# Patient Record
Sex: FEMALE | Race: WHITE | NOT HISPANIC OR LATINO | Employment: UNEMPLOYED | URBAN - NONMETROPOLITAN AREA
[De-identification: names, ages, dates, MRNs, and addresses within clinical notes are randomized per-mention and may not be internally consistent; named-entity substitution may affect disease eponyms.]

---

## 2017-02-23 ENCOUNTER — PATIENT MESSAGE (OUTPATIENT)
Dept: MEDICAL GROUP | Facility: PHYSICIAN GROUP | Age: 27
End: 2017-02-23

## 2017-04-18 ENCOUNTER — OFFICE VISIT (OUTPATIENT)
Dept: MEDICAL GROUP | Facility: PHYSICIAN GROUP | Age: 27
End: 2017-04-18
Payer: OTHER GOVERNMENT

## 2017-04-18 VITALS
WEIGHT: 223 LBS | DIASTOLIC BLOOD PRESSURE: 74 MMHG | OXYGEN SATURATION: 99 % | BODY MASS INDEX: 38.07 KG/M2 | SYSTOLIC BLOOD PRESSURE: 126 MMHG | HEIGHT: 64 IN | HEART RATE: 102 BPM | RESPIRATION RATE: 16 BRPM | TEMPERATURE: 99.8 F

## 2017-04-18 DIAGNOSIS — M25.561 CHRONIC PAIN OF RIGHT KNEE: ICD-10-CM

## 2017-04-18 DIAGNOSIS — G89.29 CHRONIC PAIN OF RIGHT KNEE: ICD-10-CM

## 2017-04-18 DIAGNOSIS — F41.9 ANXIETY: ICD-10-CM

## 2017-04-18 PROCEDURE — 99213 OFFICE O/P EST LOW 20 MIN: CPT | Performed by: NURSE PRACTITIONER

## 2017-04-19 NOTE — ASSESSMENT & PLAN NOTE
Patient is finally discontinued all her psych meds. She weaned off slowly was able to help herself with talk therapy and understanding what was actually going on with her feelings she feels fairly stable now no suicidal ideation or homicidal ideation and no panic. She is off medicines and planning to get pregnant in the next 6 months.

## 2017-04-19 NOTE — PROGRESS NOTES
Chief Complaint   Patient presents with   • Knee Pain     rt knee / weight loss / pregnancy        HISTORY OF PRESENT ILLNESS: Patient is a @ age 26 here  today to discuss:    Interval history:     Hospitalizations NO     Injuries  NO     Illness  NO        Anxiety  Patient is finally discontinued all her psych meds. She weaned off slowly was able to help herself with talk therapy and understanding what was actually going on with her feelings she feels fairly stable now no suicidal ideation or homicidal ideation and no panic. She is off medicines and planning to get pregnant in the next 6 months.    BMI 35.0-35.9,adult  Patient continues to be frustrated with weight gain. She describes normal weight is a college student, and then excessive calories and drinking put on about 100 pounds. She is now  stable no partying and finds herself 100 pounds overweight. She is here for information. I suggested Weight Watchers and to attend meetings to learn how to navigate to the foods and get support. As far as exercise she is to return to swimming soon as the pool is available to her and I suggest that she sign up for a 5K.    Chronic pain of right knee  Patient had some days if not weeks with severe knee pain bilateral, patient pasted rested and realized that her weight gaining has hurt her knees. This is giving her more motivation to try to lose away. Currently the knees are not painful no redness, swelling, or weakness noted. There is no injury.        Allergies:Review of patient's allergies indicates no known allergies.    Current Outpatient Prescriptions Ordered in [facility]   Medication Sig Dispense Refill   • sertraline (ZOLOFT) 25 MG tablet Take one half pill daily. 90 Tab 0     No current Epic-ordered facility-administered medications on file.       Past Medical History   Diagnosis Date   • Anxiety    • Depression        Social History   Substance Use Topics   • Smoking status: Never Smoker    • Smokeless tobacco:  "Never Used   • Alcohol Use: Yes      Comment: occassional       Family Status   Relation Status Death Age   • Mother Alive    • Father Alive    • Brother Alive    History reviewed. No pertinent family history.    ROS: As documented in my HPI      Exam:  Blood pressure 126/74, pulse 102, temperature 37.7 °C (99.8 °F), resp. rate 16, height 1.626 m (5' 4.02\"), weight 101.152 kg (223 lb), SpO2 99 %.  General:  Well nourished, well developed female in NAD  Head: Nontender scalp. No lesions  Neck: Supple. Symmetric Thyroid palpated. No bruits  Pulmonary:  Normal effort.   Cardiovascular: Regular rate   Extremities: no clubbing, cyanosis, or edema.  Psych: Alert and oriented x3.    Neurological: No focal deficits    Please note that this dictation was created using voice recognition software. I have made every reasonable attempt to correct obvious errors, but I expect that there are errors of grammar and possibly content that I did not discover before finalizing the note.    Assessment/Plan:  1. Chronic pain of right knee  Current status of condition is chronic and controlled on therapy.     2. Anxiety  No medications and stable.    3. BMI 35.0-35.9,adult  Patient identified as having weight management issue.  Appropriate orders and counseling given.      Planning pregnancy: remove IUD in August ( she can schedule with  Me) On prenatal vitamins.     "

## 2017-04-19 NOTE — ASSESSMENT & PLAN NOTE
Patient had some days if not weeks with severe knee pain bilateral, patient pasted rested and realized that her weight gaining has hurt her knees. This is giving her more motivation to try to lose away. Currently the knees are not painful no redness, swelling, or weakness noted. There is no injury.

## 2017-04-19 NOTE — ASSESSMENT & PLAN NOTE
Patient continues to be frustrated with weight gain. She describes normal weight is a college student, and then excessive calories and drinking put on about 100 pounds. She is now  stable no partying and finds herself 100 pounds overweight. She is here for information. I suggested Weight Watchers and to attend meetings to learn how to navigate to the foods and get support. As far as exercise she is to return to swimming soon as the pool is available to her and I suggest that she sign up for a 5K.

## 2017-06-13 ENCOUNTER — OFFICE VISIT (OUTPATIENT)
Dept: URGENT CARE | Facility: PHYSICIAN GROUP | Age: 27
End: 2017-06-13
Payer: OTHER GOVERNMENT

## 2017-06-13 VITALS
HEART RATE: 115 BPM | DIASTOLIC BLOOD PRESSURE: 98 MMHG | TEMPERATURE: 98.4 F | RESPIRATION RATE: 18 BRPM | BODY MASS INDEX: 37.39 KG/M2 | WEIGHT: 219 LBS | SYSTOLIC BLOOD PRESSURE: 130 MMHG | HEIGHT: 64 IN | OXYGEN SATURATION: 98 %

## 2017-06-13 PROCEDURE — 99202 OFFICE O/P NEW SF 15 MIN: CPT | Performed by: EMERGENCY MEDICINE

## 2017-06-13 ASSESSMENT — ENCOUNTER SYMPTOMS
HEADACHES: 0
FOCAL WEAKNESS: 0
SHORTNESS OF BREATH: 0
NAUSEA: 0
BLURRED VISION: 0
TREMORS: 0
WEAKNESS: 0
NERVOUS/ANXIOUS: 1
FEVER: 0
DIZZINESS: 0
ABDOMINAL PAIN: 0
BACK PAIN: 0
VOMITING: 0
TINGLING: 1
VISUAL CHANGE: 0
ARTHRALGIAS: 0
NECK PAIN: 0

## 2017-06-13 NOTE — MR AVS SNAPSHOT
"        Brandie Mj   2017 8:40 AM   Office Visit   MRN: [id number]    Department:  [facility]   Dept Phone:  [phone]    Description:  Female : 1990   Provider:  Vern Marx M.D.           Reason for Visit     Tingling tingling/numbness right leg/right hand ( started 3 am      Allergies as of 2017     No Known Allergies      You were diagnosed with     Paresthesias/numbness   [[id number]]         Vital Signs     Blood Pressure Pulse Temperature Respirations Height Weight    130/98 mmHg 115 36.9 °C (98.4 °F) 18 1.626 m (5' 4\") 99.338 kg (219 lb)    Body Mass Index Oxygen Saturation Smoking Status             37.57 kg/m2 98% Never Smoker          Basic Information     Date Of Birth Sex Race Ethnicity Preferred Language    1990 Female White Non- English      Problem List              ICD-10-CM Priority Class Noted - Resolved    Anxiety F41.9   2016 - Present    Depression F32.9   2016 - Present    BMI 35.0-35.9,adult Z68.35   2016 - Present    Chronic pain of right knee M25.561, G89.29   2017 - Present      Health Maintenance        Date Due Completion Dates    IMM DTaP/Tdap/Td Vaccine (1 - Tdap) 2007 ---    PAP SMEAR 2009 ---            Current Immunizations     No immunizations on file.      Below and/or attached are the medications your provider expects you to take. Review all of your home medications and newly ordered medications with your provider and/or pharmacist. Follow medication instructions as directed by your provider and/or pharmacist. Please keep your medication list with you and share with your provider. Update the information when medications are discontinued, doses are changed, or new medications (including over-the-counter products) are added; and carry medication information at all times in the event of emergency situations     Allergies:  No Known Allergies          Medications  Valid as of: 2017 - 10:56 AM   " Generic Name Brand Name Tablet Size Instructions for use    Prenatal MV-Min-Fe Fum-FA-DHA   Take  by mouth.        Sertraline HCl (Tab) ZOLOFT 25 MG Take one half pill daily.        Turmeric   Take  by mouth.        .                 Medicines prescribed today were sent to:     [id number]  [place], [place] - [address] [facility] [address] [facility] [address]    [address] [place] [address]    Phone: [phone] Fax: [phone]    Open 24 Hours?: No      Medication refill instructions:       If your prescription bottle indicates you have medication refills left, it is not necessary to call your provider’s office. Please contact your pharmacy and they will refill your medication.    If your prescription bottle indicates you do not have any refills left, you may request refills at any time through one of the following ways: The online EXENDIS system (except Urgent Care), by calling your provider’s office, or by asking your pharmacy to contact your provider’s office with a refill request. Medication refills are processed only during regular business hours and may not be available until the next business day. Your provider may request additional information or to have a follow-up visit with you prior to refilling your medication.   *Please Note: Medication refills are assigned a new Rx number when refilled electronically. Your pharmacy may indicate that no refills were authorized even though a new prescription for the same medication is available at the pharmacy. Please request the medicine by name with the pharmacy before contacting your provider for a refill.        Instructions    Paresthesia  Paresthesia is a burning or prickling feeling. This feeling can happen in any part of the body. It often happens in the hands, arms, legs, or feet. Usually, it is not painful. In most cases, the feeling goes away in a short time and is not a sign of a serious problem.  HOME CARE  · Avoid drinking  alcohol.  · Try massage or needle therapy (acupuncture) to help with your problems.  · Keep all follow-up visits as told by your doctor. This is important.  GET HELP IF:  · You keep on having episodes of paresthesia.  · Your burning or prickling feeling gets worse when you walk.  · You have pain or cramps.  · You feel dizzy.  · You have a rash.  GET HELP RIGHT AWAY IF:  · You feel weak.  · You have trouble walking or moving.  · You have problems speaking, understanding, or seeing.  · You feel confused.  · You cannot control when you pee (urinate) or poop (bowel movement).  · You lose feeling (numbness) after an injury.  · You pass out (faint).     This information is not intended to replace advice given to you by your health care provider. Make sure you discuss any questions you have with your health care provider.     Document Released: 11/30/2009 Document Revised: 05/03/2016 Document Reviewed: 12/14/2015  [employer] Interactive Patient Education ©2016 [employer]            [employer] Access Code: Activation code not generated  Current [employer] Status: Active

## 2017-06-13 NOTE — PATIENT INSTRUCTIONS
Paresthesia  Paresthesia is a burning or prickling feeling. This feeling can happen in any part of the body. It often happens in the hands, arms, legs, or feet. Usually, it is not painful. In most cases, the feeling goes away in a short time and is not a sign of a serious problem.  HOME CARE  · Avoid drinking alcohol.  · Try massage or needle therapy (acupuncture) to help with your problems.  · Keep all follow-up visits as told by your doctor. This is important.  GET HELP IF:  · You keep on having episodes of paresthesia.  · Your burning or prickling feeling gets worse when you walk.  · You have pain or cramps.  · You feel dizzy.  · You have a rash.  GET HELP RIGHT AWAY IF:  · You feel weak.  · You have trouble walking or moving.  · You have problems speaking, understanding, or seeing.  · You feel confused.  · You cannot control when you pee (urinate) or poop (bowel movement).  · You lose feeling (numbness) after an injury.  · You pass out (faint).     This information is not intended to replace advice given to you by your health care provider. Make sure you discuss any questions you have with your health care provider.     Document Released: 11/30/2009 Document Revised: 05/03/2016 Document Reviewed: 12/14/2015  [employer] Interactive Patient Education ©2016 [employer]

## 2017-06-13 NOTE — PROGRESS NOTES
"Subjective:      Brandie Barker is a 26 y.o. female who presents with Tingling            Tingling  This is a new problem. The current episode started today. The problem occurs constantly. The problem has been gradually improving. Pertinent negatives include no abdominal pain, arthralgias, chest pain, fever, headaches, nausea, neck pain, rash, visual change, vomiting or weakness. Nothing aggravates the symptoms. She has tried nothing for the symptoms.    notes recovering from recent URI; also notes taking tumeric recently. Onset associated with sleeping on couch. No trauma.     Review of Systems   Constitutional: Negative for fever.   Eyes: Negative for blurred vision.   Respiratory: Negative for shortness of breath.    Cardiovascular: Negative for chest pain.   Gastrointestinal: Negative for nausea, vomiting and abdominal pain.   Musculoskeletal: Negative for back pain, arthralgias and neck pain.   Skin: Negative for rash.   Neurological: Negative for dizziness, tremors, focal weakness, weakness and headaches.   Psychiatric/Behavioral: The patient is nervous/anxious.      PMH:  has a past medical history of Anxiety and Depression.  MEDS:   Current outpatient prescriptions:   •  Prenatal MV-Min-Fe Fum-FA-DHA (PRENATAL 1 PO), Take  by mouth., Disp: , Rfl:   •  TURMERIC PO, Take  by mouth., Disp: , Rfl:   •  sertraline (ZOLOFT) 25 MG tablet, Take one half pill daily., Disp: 90 Tab, Rfl: 0  ALLERGIES: No Known Allergies  SURGHX: History reviewed. No pertinent past surgical history.  SOCHX:  reports that she has never smoked. She has never used smokeless tobacco. She reports that she drinks alcohol. She reports that she does not use illicit drugs.  FH: family history is not on file.       Objective:     /98 mmHg  Pulse 115  Temp(Src) 36.9 °C (98.4 °F)  Resp 18  Ht 1.626 m (5' 4\")  Wt 99.338 kg (219 lb)  BMI 37.57 kg/m2  SpO2 98%     Physical Exam   Constitutional: She is oriented to person, place, and time. " She appears well-developed and well-nourished. She is cooperative. She does not have a sickly appearance. She does not appear ill. No distress.   HENT:   Head: Normocephalic.   Eyes: Conjunctivae are normal.   Neck: Full passive range of motion without pain and phonation normal. Neck supple.   Cardiovascular: Normal rate, regular rhythm and normal heart sounds.    Pulmonary/Chest: Effort normal and breath sounds normal.   Abdominal: She exhibits no distension.   Musculoskeletal:        Cervical back: She exhibits no tenderness.        Thoracic back: She exhibits no tenderness.        Lumbar back: She exhibits no tenderness.   Neurological: She is alert and oriented to person, place, and time. She has normal strength. She displays no tremor. No sensory deficit. Coordination and gait normal.   Skin: Skin is warm, dry and intact.   Psychiatric: She has a normal mood and affect. Her speech is normal and behavior is normal.               Assessment/Plan:     1. Paresthesias/numbness  Advised likely self-limited process associated with position sleeping, possible role of turmeric.  Advise follow with PCP if symptoms persist.

## 2017-06-27 ENCOUNTER — TELEPHONE (OUTPATIENT)
Dept: MEDICAL GROUP | Facility: PHYSICIAN GROUP | Age: 27
End: 2017-06-27

## 2017-06-27 ENCOUNTER — PATIENT MESSAGE (OUTPATIENT)
Dept: MEDICAL GROUP | Facility: PHYSICIAN GROUP | Age: 27
End: 2017-06-27

## 2017-06-27 NOTE — TELEPHONE ENCOUNTER
From: Brandie Barker  To: SIMONE Ann  Sent: 6/27/2017 12:19 AM PDT  Subject: Non-Urgent Medical Question    Hi Dr. Perry,    I'm having high blood pressure and my  advice nurse recommended I contact and see my provider within 18 hours. If I can see you as soon as possible, that would be great. I will also call the office tomorrow. Thank you in advance.

## 2017-06-28 ENCOUNTER — OFFICE VISIT (OUTPATIENT)
Dept: MEDICAL GROUP | Facility: PHYSICIAN GROUP | Age: 27
End: 2017-06-28
Payer: OTHER GOVERNMENT

## 2017-06-28 VITALS
HEART RATE: 111 BPM | SYSTOLIC BLOOD PRESSURE: 150 MMHG | BODY MASS INDEX: 36.7 KG/M2 | WEIGHT: 215 LBS | DIASTOLIC BLOOD PRESSURE: 96 MMHG | OXYGEN SATURATION: 98 % | RESPIRATION RATE: 12 BRPM | HEIGHT: 64 IN | TEMPERATURE: 98.4 F

## 2017-06-28 DIAGNOSIS — F32.9 REACTIVE DEPRESSION: ICD-10-CM

## 2017-06-28 DIAGNOSIS — R03.0 TRANSIENT ELEVATED BLOOD PRESSURE: ICD-10-CM

## 2017-06-28 DIAGNOSIS — F41.9 ANXIETY: ICD-10-CM

## 2017-06-28 PROCEDURE — 99214 OFFICE O/P EST MOD 30 MIN: CPT | Performed by: NURSE PRACTITIONER

## 2017-06-28 RX ORDER — ESCITALOPRAM OXALATE 10 MG/1
10 TABLET ORAL DAILY
Qty: 90 TAB | Refills: 1 | Status: SHIPPED | OUTPATIENT
Start: 2017-06-28 | End: 2018-02-19 | Stop reason: SDUPTHER

## 2017-06-28 RX ORDER — HYDROCHLOROTHIAZIDE 12.5 MG/1
12.5 TABLET ORAL DAILY
Qty: 30 TAB | Refills: 1 | Status: SHIPPED | OUTPATIENT
Start: 2017-06-28 | End: 2017-08-25

## 2017-06-28 NOTE — PATIENT INSTRUCTIONS
Start Lexapro 10 mg daily    Follow up with me in 2 months    Low dose BP med, likely for month or 2--probably less    Referral to psychology for CBT

## 2017-06-28 NOTE — PROGRESS NOTES
Chief Complaint   Patient presents with   • Other     anxiety questions         This is a 26 y.o.female patient that presents today with the following: Anxiety    Anxiety  This is a chronic condition, that had previously been under control and she completely discontinued her medication. 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 She has been on Zoloft in the past, and while this controlled her symptoms well she did not like the side effects which included decreased libido. She does deny suicidal and homicidal ideations, hallucinations, racing thoughts and flights of ideas. She already verbalizes understanding of ways to cope with her depression and anxiety--as she has seen a therapist in the past.  She is concerned because as her anxiety has increased, she notes that her blood pressure has increased as well. Today is 150/96, and she notes that the last time her anxiety started to exacerbate, her blood pressure worsened. She also admits to a bit of white coat syndrome. I discussed with her options for treatment including pharmacotherapy and counseling, patient is interested in both would like to start medication and has less sexual side effects. We will have her start Lexapro 10 mg daily. Referral has been placed to psychology for CBT. I will start her on a very low dose diuretic for blood pressure just likely transient due to her anxiety. She is to follow-up with me in 2 months.        No results found for any previous visit.      clinical course has been stable    Past Medical History   Diagnosis Date   • Anxiety    • Depression        No past surgical history on file.    No family history on file.    Review of patient's allergies indicates no known allergies.    Current Outpatient Prescriptions Ordered in [facility]   Medication Sig Dispense Refill   • escitalopram (LEXAPRO) 10 MG Tab Take 1 Tab by mouth every day.  "90 Tab 1   • hydrochlorothiazide (HYDRODIURIL) 12.5 MG tablet Take 1 Tab by mouth every day. 30 Tab 1   • Prenatal MV-Min-Fe Fum-FA-DHA (PRENATAL 1 PO) Take  by mouth.       No current Epic-ordered facility-administered medications on file.       Constitutional ROS: No unexpected change in weight, No weakness, No unexplained fevers, sweats, or chills  Pulmonary ROS: No chronic cough, sputum, or hemoptysis, No shortness of breath, No recent change in breathing  Cardiovascular ROS: No chest pain, No edema, No palpitations, Positive for elevated blood pressure, per history of present illness  Gastrointestinal ROS: No abdominal pain, No nausea, vomiting, diarrhea, or constipation, no blood in stool  Musculoskeletal/Extremities ROS: No clubbing, No peripheral edema, No pain, redness or swelling on the joints  Neurologic ROS: Normal development, No seizures, No weakness  Psychiatric ROS: Positive per history of present illness    Physical exam:  /96 mmHg  Pulse 111  Temp(Src) 36.9 °C (98.4 °F)  Resp 12  Ht 1.626 m (5' 4\")  Wt 97.523 kg (215 lb)  BMI 36.89 kg/m2  SpO2 98%  General Appearance: Young female, alert, no distress, obese, well-groomed  Skin: Skin color, texture, turgor normal. No rashes or lesions.  Lungs: negative findings: normal respiratory rate and rhythm, lungs clear to auscultation  Heart: negative. RRR without murmur, gallop, or rubs.  No ectopy.  Abdomen: Abdomen soft, non-tender. BS normal. No masses,  No organomegaly  Musculoskeletal: negative findings: ROM of all joints is normal, no evidence of joint instability, strength normal, no deformities present  Neurologic: intact, oriented, mood appropriate, judgment intact. Cranial nerves II through XII grossly intact    Medical decision making/discussion: Patient here to discuss restarting medications for anxiety, she is also requesting referral for cognitive behavioral therapy. Referral has been placed. I will have her start Lexapro, 10 mg " daily. I did discuss with her the risks, benefits and side effects of this medication. She was encouraged to continue exercising, eating healthy diet. I would like her follow-up with me in 2 months, sooner if needed.    Brandie was seen today for other.    Diagnoses and all orders for this visit:    Anxiety  -     escitalopram (LEXAPRO) 10 MG Tab; Take 1 Tab by mouth every day.  -     REFERRAL TO PSYCHOLOGY    Reactive depression  -     escitalopram (LEXAPRO) 10 MG Tab; Take 1 Tab by mouth every day.  -     REFERRAL TO PSYCHOLOGY    Transient elevated blood pressure  -     hydrochlorothiazide (HYDRODIURIL) 12.5 MG tablet; Take 1 Tab by mouth every day.          Please note that this dictation was created using voice recognition software. I have made every reasonable attempt to correct obvious errors, but I expect that there are errors of grammar and possibly content that I did not discover before finalizing the note.

## 2017-06-28 NOTE — MR AVS SNAPSHOT
"        Brandie Mj   2017 10:20 AM   Office Visit   MRN: [id number]    Department:  [facility]   Dept Phone:  [phone]    Description:  Female : 1990   Provider:  IRINEO Chou           Reason for Visit     Other anxiety questions      Allergies as of 2017     No Known Allergies      You were diagnosed with     Anxiety   [823240]       Reactive depression   [989116]       Transient elevated blood pressure   [472217]         Vital Signs     Blood Pressure Pulse Temperature Respirations Height Weight    150/96 mmHg 111 36.9 °C (98.4 °F) 12 1.626 m (5' 4\") 97.523 kg (215 lb)    Body Mass Index Oxygen Saturation Smoking Status             36.89 kg/m2 98% Never Smoker          Basic Information     Date Of Birth Sex Race Ethnicity Preferred Language    1990 Female White Non- English      Your appointments     Aug 25, 2017  9:20 AM   Established Patient with IRINEO Chou   [address] [address] [phone]           You will be receiving a confirmation call a few days before your appointment from our automated call confirmation system.              Problem List              ICD-10-CM Priority Class Noted - Resolved    Anxiety F41.9   2016 - Present    Depression F32.9   2016 - Present    BMI 35.0-35.9,adult Z68.35   2016 - Present    Chronic pain of right knee M25.561, G89.29   2017 - Present      Health Maintenance        Date Due Completion Dates    IMM DTaP/Tdap/Td Vaccine (1 - Tdap) 2007 ---    PAP SMEAR 2009 ---            Current Immunizations     No immunizations on file.      Below and/or attached are the medications your provider expects you to take. Review all of your home medications and newly ordered medications with your provider and/or pharmacist. Follow medication instructions as directed by your provider and/or pharmacist. Please keep " your medication list with you and share with your provider. Update the information when medications are discontinued, doses are changed, or new medications (including over-the-counter products) are added; and carry medication information at all times in the event of emergency situations     Allergies:  No Known Allergies          Medications  Valid as of: June 28, 2017 - 10:47 AM    Generic Name Brand Name Tablet Size Instructions for use    Escitalopram Oxalate (Tab) LEXAPRO 10 MG Take 1 Tab by mouth every day.        HydroCHLOROthiazide (Tab) HYDRODIURIL 12.5 MG Take 1 Tab by mouth every day.        Prenatal MV-Min-Fe Fum-FA-DHA   Take  by mouth.        .                 Medicines prescribed today were sent to:     [facility] [address] [place] [address], [place] - [address] [facility] [address] [place] [address] [address]    [address] [facility] [address] [place] [address]    Phone: [phone] Fax: [phone]    Open 24 Hours?: No      Medication refill instructions:       If your prescription bottle indicates you have medication refills left, it is not necessary to call your provider’s office. Please contact your pharmacy and they will refill your medication.    If your prescription bottle indicates you do not have any refills left, you may request refills at any time through one of the following ways: The online Infor system (except Urgent Care), by calling your provider’s office, or by asking your pharmacy to contact your provider’s office with a refill request. Medication refills are processed only during regular business hours and may not be available until the next business day. Your provider may request additional information or to have a follow-up visit with you prior to refilling your medication.   *Please Note: Medication refills are assigned a new Rx number when refilled electronically. Your pharmacy may indicate that no refills were authorized even though a new prescription for the same medication is available at  the pharmacy. Please request the medicine by name with the pharmacy before contacting your provider for a refill.        Referral     A referral request has been sent to our patient care coordination department. Please allow 3-5 business days for us to process this request and contact you either by phone or mail. If you do not hear from us by the 5th business day, please call us at [phone].        Instructions    Start Lexapro 10 mg daily    Follow up with me in 2 months    Low dose BP med, likely for month or 2--probably less    Referral to psychology for CBT          MyChart Access Code: Activation code not generated  Current MyChart Status: Active

## 2017-06-28 NOTE — ASSESSMENT & PLAN NOTE
This is a chronic condition, that had previously been under control and she completely discontinued her medication. This is completely situational in nature, she is currently going through a move and has stress related to her current job. She has been on Zoloft in the past, and while this controlled her symptoms well she did not like the side effects which included decreased libido. She does deny suicidal and homicidal ideations, hallucinations, racing thoughts and flights of ideas.  She is concerned because as her anxiety has increased, she notes that her blood pressure has increased as well. Today is 150/96, and she notes that the last time her anxiety started to exacerbate, her blood pressure worsened. She also admits to a bit of white coat syndrome. I discussed with her options for treatment including pharmacotherapy and counseling, patient is interested in both would like to start medication and has less sexual side effects. We will have her start Lexapro 10 mg daily. Referral has been placed to psychology for CBT. I will start her on a very low dose diuretic for blood pressure just likely transient due to her anxiety. She is to follow-up with me in 2 months.

## 2017-08-10 ENCOUNTER — APPOINTMENT (OUTPATIENT)
Dept: BEHAVIORAL HEALTH | Facility: PHYSICIAN GROUP | Age: 27
End: 2017-08-10
Payer: OTHER GOVERNMENT

## 2017-08-25 ENCOUNTER — OFFICE VISIT (OUTPATIENT)
Dept: MEDICAL GROUP | Facility: PHYSICIAN GROUP | Age: 27
End: 2017-08-25
Payer: OTHER GOVERNMENT

## 2017-08-25 VITALS
RESPIRATION RATE: 16 BRPM | TEMPERATURE: 97.9 F | BODY MASS INDEX: 36.47 KG/M2 | HEART RATE: 83 BPM | SYSTOLIC BLOOD PRESSURE: 124 MMHG | DIASTOLIC BLOOD PRESSURE: 76 MMHG | OXYGEN SATURATION: 98 % | WEIGHT: 213.6 LBS | HEIGHT: 64 IN

## 2017-08-25 DIAGNOSIS — F41.9 ANXIETY: ICD-10-CM

## 2017-08-25 DIAGNOSIS — F32.9 REACTIVE DEPRESSION: ICD-10-CM

## 2017-08-25 PROCEDURE — 99213 OFFICE O/P EST LOW 20 MIN: CPT | Performed by: NURSE PRACTITIONER

## 2017-08-25 ASSESSMENT — PATIENT HEALTH QUESTIONNAIRE - PHQ9: CLINICAL INTERPRETATION OF PHQ2 SCORE: 0

## 2017-08-25 NOTE — ASSESSMENT & PLAN NOTE
This is a chronic condition, stable and well controlled on Lexapro 10 mg daily. She is getting ready to move to [facility] which is causing her great anxiety. When she last saw me in late June 2017 she was started on Lexapro 10 mg daily. She was hesitant to take this initially, but started taking this about a week ago. She admits that she does not know if this is a placebo effect or if the medication truly is making her feel better, but she states her depression and anxiety are much improved since starting the medication. She was also treated for transient hypertension which she was certain it was caused from her anxiety. Her blood pressure was 150/96 at her last visit and she was started on a low-dose of hydrochlorothiazide, 12.5 mg daily. She has since stopped that medication, blood pressure today is 124/76. She denies symptoms of hypertension.

## 2017-08-25 NOTE — PROGRESS NOTES
Chief Complaint   Patient presents with   • Depression     Lexapro   • Other     stopped HCTZ / feeling better         This is a 26 y.o.female patient that presents today with the following:Follow-up depression and anxiety    Anxiety  This is a chronic condition, stable and well controlled on Lexapro 10 mg daily. She is getting ready to move to [facility] which is causing her great anxiety. When she last saw me in late June 2017 she was started on Lexapro 10 mg daily. She was hesitant to take this initially, but started taking this about a week ago. She admits that she does not know if this is a placebo effect or if the medication truly is making her feel better, but she states her depression and anxiety are much improved since starting the medication. She was also treated for transient hypertension which she was certain it was caused from her anxiety. Her blood pressure was 150/96 at her last visit and she was started on a low-dose of hydrochlorothiazide, 12.5 mg daily. She has since stopped that medication, blood pressure today is 124/76. She denies symptoms of hypertension.        No results found for any previous visit.      clinical course has improved    Past Medical History   Diagnosis Date   • Anxiety    • Depression        No past surgical history on file.    No family history on file.    Review of patient's allergies indicates no known allergies.    Current Outpatient Prescriptions Ordered in [facility]   Medication Sig Dispense Refill   • escitalopram (LEXAPRO) 10 MG Tab Take 1 Tab by mouth every day. 90 Tab 1   • Prenatal MV-Min-Fe Fum-FA-DHA (PRENATAL 1 PO) Take  by mouth.       No current Epic-ordered facility-administered medications on file.       Constitutional ROS: No unexpected change in weight, No weakness, No unexplained fevers, sweats, or chills  Pulmonary ROS: No chronic cough, sputum, or hemoptysis, No shortness of breath, No recent change in breathing  Cardiovascular ROS: No chest pain,  "No edema, No palpitations. Positive for history of transient elevated blood pressure, improved  Gastrointestinal ROS: No abdominal pain, No nausea, vomiting, diarrhea, or constipation, no blood in stool  Musculoskeletal/Extremities ROS: No clubbing, No peripheral edema, No pain, redness or swelling on the joints  Neurologic ROS: Normal development, No seizures, No weakness  Psychiatric ROS: Positive for anxiety and depression, per history of present illness    Physical exam:  /76 mmHg  Pulse 83  Temp(Src) 36.6 °C (97.9 °F)  Resp 16  Ht 1.626 m (5' 4\")  Wt 96.888 kg (213 lb 9.6 oz)  BMI 36.65 kg/m2  SpO2 98%  General Appearance: Young female, alert, no distress, obese, well-groomed  Skin: Skin color, texture, turgor normal. No rashes or lesions.  Lungs: negative findings: normal respiratory rate and rhythm, normal effort  Musculoskeletal: negative findings: ROM of all joints is normal, no evidence of joint instability, strength normal, no deformities present  Neurologic: intact, oriented, mood appropriate, judgment intact. Cranial nerves II-12 grossly intact    Medical decision making/discussion: Patient is to continue on Lexapro 10 mg daily. She is to establish care with new PCP for ongoing management and [facility]. She is to continue eating healthy, exercising regularly and continue efforts towards weight loss.    Brandie was seen today for depression and other.    Diagnoses and all orders for this visit:    Reactive depression    Anxiety          Please note that this dictation was created using voice recognition software. I have made every reasonable attempt to correct obvious errors, but I expect that there are errors of grammar and possibly content that I did not discover before finalizing the note.          "

## 2017-08-25 NOTE — MR AVS SNAPSHOT
"        Brandiepatricia Barker   2017 9:20 AM   Office Visit   MRN: [id number]    Department:  [facility]   Dept Phone:  [phone]    Description:  Female : 1990   Provider:  IRINEO Chou           Reason for Visit     Depression Lexapro    Other stopped HCTZ / feeling better      Allergies as of 2017     No Known Allergies      Vital Signs     Blood Pressure Pulse Temperature Respirations Height Weight    124/76 mmHg 83 36.6 °C (97.9 °F) 16 1.626 m (5' 4\") 96.888 kg (213 lb 9.6 oz)    Body Mass Index Oxygen Saturation Smoking Status             36.65 kg/m2 98% Never Smoker          Basic Information     Date Of Birth Sex Race Ethnicity Preferred Language    1990 Female White Non- English      Problem List              ICD-10-CM Priority Class Noted - Resolved    Anxiety F41.9   2016 - Present    Depression F32.9   2016 - Present    BMI 35.0-35.9,adult Z68.35   2016 - Present    Chronic pain of right knee M25.561, G89.29   2017 - Present      Health Maintenance        Date Due Completion Dates    IMM DTaP/Tdap/Td Vaccine (1 - Tdap) 2007 ---    PAP SMEAR 2009 ---    IMM INFLUENZA (1) 2017 ---            Current Immunizations     No immunizations on file.      Below and/or attached are the medications your provider expects you to take. Review all of your home medications and newly ordered medications with your provider and/or pharmacist. Follow medication instructions as directed by your provider and/or pharmacist. Please keep your medication list with you and share with your provider. Update the information when medications are discontinued, doses are changed, or new medications (including over-the-counter products) are added; and carry medication information at all times in the event of emergency situations     Allergies:  No Known Allergies          Medications  Valid as of: 2017 -  9:37 AM    Generic Name Brand Name " Tablet Size Instructions for use    Escitalopram Oxalate (Tab) LEXAPRO 10 MG Take 1 Tab by mouth every day.        Prenatal MV-Min-Fe Fum-FA-DHA   Take  by mouth.        .                 Medicines prescribed today were sent to:     [facility] [id number] - [place], [place] - [address] [facility] [address] [address] [facility]    [address] [facility] [address] [facility] [place] [address]    Phone: [phone] Fax: [phone]    Open 24 Hours?: No      Medication refill instructions:       If your prescription bottle indicates you have medication refills left, it is not necessary to call your provider’s office. Please contact your pharmacy and they will refill your medication.    If your prescription bottle indicates you do not have any refills left, you may request refills at any time through one of the following ways: The online [employer] system (except Urgent Care), by calling your provider’s office, or by asking your pharmacy to contact your provider’s office with a refill request. Medication refills are processed only during regular business hours and may not be available until the next business day. Your provider may request additional information or to have a follow-up visit with you prior to refilling your medication.   *Please Note: Medication refills are assigned a new Rx number when refilled electronically. Your pharmacy may indicate that no refills were authorized even though a new prescription for the same medication is available at the pharmacy. Please request the medicine by name with the pharmacy before contacting your provider for a refill.           [employer] Access Code: Activation code not generated  Current [employer] Status: Active